# Patient Record
Sex: MALE | Race: WHITE | NOT HISPANIC OR LATINO | URBAN - METROPOLITAN AREA
[De-identification: names, ages, dates, MRNs, and addresses within clinical notes are randomized per-mention and may not be internally consistent; named-entity substitution may affect disease eponyms.]

---

## 2017-09-08 ENCOUNTER — ALLSCRIPTS OFFICE VISIT (OUTPATIENT)
Dept: OTHER | Facility: OTHER | Age: 47
End: 2017-09-08

## 2017-09-08 DIAGNOSIS — Z00.00 ENCOUNTER FOR GENERAL ADULT MEDICAL EXAMINATION WITHOUT ABNORMAL FINDINGS: ICD-10-CM

## 2017-09-08 DIAGNOSIS — R73.09 OTHER ABNORMAL GLUCOSE: ICD-10-CM

## 2017-09-08 DIAGNOSIS — E78.5 HYPERLIPIDEMIA: ICD-10-CM

## 2017-09-08 LAB
BILIRUB UR QL STRIP: NORMAL
CLARITY UR: NORMAL
COLOR UR: YELLOW
GLUCOSE (HISTORICAL): NORMAL
HGB UR QL STRIP.AUTO: NORMAL
KETONES UR STRIP-MCNC: NORMAL MG/DL
LEUKOCYTE ESTERASE UR QL STRIP: NORMAL
NITRITE UR QL STRIP: NORMAL
PH UR STRIP.AUTO: 5 [PH]
PROT UR STRIP-MCNC: NORMAL MG/DL
SP GR UR STRIP.AUTO: 1.01
UROBILINOGEN UR QL STRIP.AUTO: NORMAL

## 2017-09-15 ENCOUNTER — GENERIC CONVERSION - ENCOUNTER (OUTPATIENT)
Dept: OTHER | Facility: OTHER | Age: 47
End: 2017-09-15

## 2017-09-15 LAB
A/G RATIO (HISTORICAL): 1.5 (ref 1.2–2.2)
ALBUMIN SERPL BCP-MCNC: 4.4 G/DL (ref 3.5–5.5)
ALP SERPL-CCNC: 64 IU/L (ref 39–117)
ALT SERPL W P-5'-P-CCNC: 30 IU/L (ref 0–44)
AMBIG ABBREV CMP14 DEFAULT (HISTORICAL): NORMAL
AMBIG ABBREV LP DEFAULT (HISTORICAL): NORMAL
AMYLASE (HISTORICAL): 51 U/L (ref 31–124)
AST SERPL W P-5'-P-CCNC: 30 IU/L (ref 0–40)
BASOPHILS # BLD AUTO: 0 %
BASOPHILS # BLD AUTO: 0 X10E3/UL (ref 0–0.2)
BILIRUB SERPL-MCNC: 0.5 MG/DL (ref 0–1.2)
BUN SERPL-MCNC: 13 MG/DL (ref 6–24)
BUN/CREA RATIO (HISTORICAL): 14 (ref 9–20)
CALCIUM SERPL-MCNC: 9.7 MG/DL (ref 8.7–10.2)
CHLORIDE SERPL-SCNC: 103 MMOL/L (ref 96–106)
CHOLEST SERPL-MCNC: 168 MG/DL (ref 100–199)
CO2 SERPL-SCNC: 26 MMOL/L (ref 18–29)
CREAT SERPL-MCNC: 0.96 MG/DL (ref 0.76–1.27)
DEPRECATED RDW RBC AUTO: 13.8 % (ref 12.3–15.4)
EGFR AFRICAN AMERICAN (HISTORICAL): 110 ML/MIN/1.73
EGFR-AMERICAN CALC (HISTORICAL): 95 ML/MIN/1.73
EOSINOPHIL # BLD AUTO: 0.1 X10E3/UL (ref 0–0.4)
EOSINOPHIL # BLD AUTO: 2 %
GLUCOSE SERPL-MCNC: 100 MG/DL (ref 65–99)
HBA1C MFR BLD HPLC: 5.3 % (ref 4.8–5.6)
HCT VFR BLD AUTO: 43.9 % (ref 37.5–51)
HDLC SERPL-MCNC: 35 MG/DL
HGB BLD-MCNC: 15.4 G/DL (ref 12.6–17.7)
IMM.GRANULOCYTES (CD4/8) (HISTORICAL): 0 %
IMM.GRANULOCYTES (CD4/8) (HISTORICAL): 0 X10E3/UL (ref 0–0.1)
LDLC SERPL CALC-MCNC: 101 MG/DL (ref 0–99)
LIPASE SERPL-CCNC: 30 U/L (ref 0–59)
LYMPHOCYTES # BLD AUTO: 1.9 X10E3/UL (ref 0.7–3.1)
LYMPHOCYTES # BLD AUTO: 39 %
MAGNESIUM SERPL-MCNC: 2 MG/DL (ref 1.6–2.3)
MCH RBC QN AUTO: 30.6 PG (ref 26.6–33)
MCHC RBC AUTO-ENTMCNC: 35.1 G/DL (ref 31.5–35.7)
MCV RBC AUTO: 87 FL (ref 79–97)
MONOCYTES # BLD AUTO: 0.3 X10E3/UL (ref 0.1–0.9)
MONOCYTES (HISTORICAL): 7 %
NEUTROPHILS # BLD AUTO: 2.5 X10E3/UL (ref 1.4–7)
NEUTROPHILS # BLD AUTO: 52 %
PLATELET # BLD AUTO: 205 X10E3/UL (ref 150–379)
POTASSIUM SERPL-SCNC: 5.1 MMOL/L (ref 3.5–5.2)
RBC (HISTORICAL): 5.03 X10E6/UL (ref 4.14–5.8)
SODIUM SERPL-SCNC: 142 MMOL/L (ref 134–144)
TOT. GLOBULIN, SERUM (HISTORICAL): 3 G/DL (ref 1.5–4.5)
TOTAL PROTEIN (HISTORICAL): 7.4 G/DL (ref 6–8.5)
TRIGL SERPL-MCNC: 159 MG/DL (ref 0–149)
VLDLC SERPL CALC-MCNC: 32 MG/DL (ref 5–40)
WBC # BLD AUTO: 4.8 X10E3/UL (ref 3.4–10.8)

## 2017-09-16 LAB
INTERPRETATION (HISTORICAL): NORMAL
TSH SERPL DL<=0.05 MIU/L-ACNC: 0.52 UIU/ML (ref 0.45–4.5)

## 2017-09-17 ENCOUNTER — GENERIC CONVERSION - ENCOUNTER (OUTPATIENT)
Dept: OTHER | Facility: OTHER | Age: 47
End: 2017-09-17

## 2018-01-15 NOTE — PROGRESS NOTES
Assessment    1  Encounter for preventive health examination (V70 0) (Z00 00)   2  BMI 34 0-34 9,adult (V85 34) (Z68 34)    Plan  Abnormal blood sugar, BMI 34 0-34 9,adult, Borderline hyperlipidemia    · (1) HEMOGLOBIN A1C; Status:Active; Requested [id number]:[id number]; BMI 34 0-34 9,adult, Borderline hyperlipidemia, Health Maintenance    · (1) AMYLASE; Status:Active; Requested XSI:[id number];    · (1) CBC/PLT/DIFF; Status:Active; Requested QZF:[id number];    · (1) COMPREHENSIVE METABOLIC PANEL; Status:Active; Requested IKP:[id number];    · (1) LIPASE; Status:Active; Requested AKA:[id number];    · (1) LIPID PANEL, FASTING; Status:Active; Requested FZI:[id number];    · (1) MAGNESIUM; Status:Active; Requested for:55Xrg1164;    · (1) TSH WITH FT4 REFLEX; Status:Active; Requested FUY:[id number]; Health Maintenance    · Urine Dip Automated- POC; Status:Complete;   Done: [id number] 03:36PM    Discussion/Summary  Currently, he eats an adequate diet and has an adequate exercise regimen  Prostate cancer screening: the risks and benefits of prostate cancer screening were discussed  Colorectal cancer screening: the risks and benefits of colorectal cancer screening were discussed  Screening lab work includes glucose, lipid profile, 25-hydroxyvitamin D and urinalysis  The risks and benefits of immunizations were discussed  Advice and education were given regarding nutrition, weight loss, cardiovascular risk reduction, helmet use, seat belt use and advanced directive planning  Patient discussion: discussed with the patient  Possible side effects of new medications were reviewed with the patient/guardian today  The treatment plan was reviewed with the patient/guardian  The patient/guardian understands and agrees with the treatment plan      Chief Complaint  pt here for PE would like to discuss cardiac , patients dad has heart issues  tc/cma      Active Problems    1  Skin tag, acquired (701 9) (L91 8)   2   Tick bite (919 4,E906 4) [facility])    Past Medical History    · History of No significant past medical history   · History of No significant past surgical history    Family History  Mother    · No pertinent family history  Father    · Family history of heart attack (V17 3) (Z82 49)  Paternal Grandfather    · Family history of malignant neoplasm (V16 9) (Z80 9)    Social History    ·    · Never a smoker   · No alcohol use   · No drug use    Current Meds   1  No Reported Medications Recorded    Allergies    1  No Known Drug Allergies    Vitals   Recorded: 08Sep2017 03:15PM   Temperature 98 4 F, Temporal   Heart Rate 92, R Radial   Pulse Quality Normal, R Radial   Respiration Quality Normal   Respiration 16   Systolic 853, RUE, Sitting   Diastolic 80, RUE, Sitting   Height 6 ft    Weight 254 lb    BMI Calculated 34 45   BSA Calculated 2 36     Results/Data  Urine Dip Automated- POC [id number] 03:36PM Fletcher García     Test Name Result Flag Reference   Color Yellow     Clarity Transparent     Leukocytes neg     Nitrite neg     Blood neg     Bilirubin neg     Urobilinogen norm     Protein neg     Ph 5     Specific Gravity 1 010     Ketone neg     Glucose norm       PHQ-2 Adult Depression Screening 08Sep2017 03:21PM User, Joshua     Test Name Result Flag Reference   PHQ-2 Adult Depression Score 0     Over the last two weeks, how often have you been bothered by any of the following problems? Little interest or pleasure in doing things: Not at all - 0  Feeling down, depressed, or hopeless: Not at all - 0   PHQ-2 Adult Depression Screening Negative         Procedure    Procedure: Visual Acuity Test    Indication: routine screening     Results: 20/15 in both eyes without corrective device, 20/15 in the right eye without corrective device, 20/15 in the left eye without corrective device      Signatures   Electronically signed by : YADIRA Vera ; Sep 11 2017  7:46AM EST                       (Author)

## 2018-01-16 NOTE — RESULT NOTES
Discussion/Summary   please call or sched appt to further discuss results--best regards-Dr Latonya Buckley     Verified Results  [facility]) Cardiovascular Risk Assessment [id number] 10:08AM Beatriz Sandoval     Test Name Result Flag Reference   Interpretation Note     -------------------------------  CARDIOVASCULAR REPORT:  -------------------------------  Current available clinical information suggests the  patient's risk is at least LOW  One major CHD risk factor is  present (HDL-C less than 40)  If the patient has CHD or a  CHD risk equivalent, the risk category is high  If patient  does not have CHD or a CHD risk equivalent, consider use of  the Pooled Cohort Equations to estimate 10-year CVD risk, as  individuals with greater than 7 5% risk may warrant more  intensive therapy  The calculator can be found at:  http://tools  cardiosource org/RGROV-Syor-Grrkthybz/  -  Insulin resistance, obesity, excessive alcohol use, smoking,  nephrotic syndrome, liver disease, and certain medications  can cause secondary dyslipidemia  Consider evaluation if  clinically indicated  -  Therapeutic lifestyle changes are always valuable to achieve  optimal blood lipid status (diet, exercise, weight  management)  -------------------------------  LIPID MANAGEMENT  Select one patient risk category based upon medical history  and clinical judgment  Additional risk factors such as  personal or family history of premature CHD, smoking, and  hypertension modify a patient's goals of therapy  In CVD  prevention, the intensity of therapy should be adjusted to  the level of patient risk  MODERATE intensity statin therapy  generally results in an average LDL-C reduction of 30% to  less than 50% from the untreated baseline  Examples include  (daily doses): atorvastatin 10-20 mg, rosuvastatin 5-10 mg,  simvastatin 20-40 mg, pravastatin 40-80 mg, lovastatin 40  mg   HIGH intensity statin therapy generally results in an  average LDL-C reduction of 50% or more from the untreated  baseline  Examples include (daily doses): atorvastatin 40-80  mg and rosuvastatin 20 mg   -------------------------------  LOW RISK ASSESSMENT AND TREATMENT SUGGESTIONS  -------------------------------  LDL-C is acceptable, was 107 and now is 101 mg/dL  Non-HDL  Cholesterol is acceptable, was 140 and now is 133 mg/dL  -  Considerations for use of statin therapy include family  history of premature atherosclerotic disease, elevated  coronary artery calcium score, ankle-brachial index < 0 9,  elevated CRP, or elevated 10-year or lifetime CVD risk  Elevated triglycerides may be associated with increased  cardiovascular risk due to increased numbers of atherogenic  lipoprotein particles  Co-morbid conditions should be  evaluated and treated  -------------------------------  INTERMEDIATE RISK ASSESSMENT AND TREATMENT SUGGESTIONS  -------------------------------  LDL-C is acceptable, was 107 and now is 101 mg/dL  Non-HDL  Cholesterol is acceptable, was 140 and now is 133 mg/dL  -  Consider measurement of LDL particle number or Apo B to  adjudicate need for further LDL lowering therapy  Consider  beginning or increasing statin  Factors that may influence  statin use include family history of premature  atherosclerotic disease, elevated coronary artery calcium  score, ankle-brachial index < 0 9, elevated CRP, or elevated  10-year or lifetime CVD risk  If statin cannot be tolerated  or increased, alternatives include use of an intestinal  agent (ezetimibe or bile acid sequestrant), niacin, and/or  fish oil   -------------------------------  HIGH RISK ASSESSMENT AND TREATMENT SUGGESTIONS  -------------------------------  LDL-C is borderline high, was 107 and now is 101 mg/dL  Non-HDL Cholesterol is borderline high, was 140 and now is  133 mg/dL  -  Begin statin  If statin already in use, consider increasing  dose to achieve at least a 50% LDL reduction from baseline    Moderate or high intensity statin is preferred  If statin  cannot be tolerated or increased, alternatives include use  of an intestinal agent (ezetimibe or bile acid sequestrant),  niacin, and/or fish oil   -------------------------------  DISCLAIMER  These assessments and treatment suggestions are provided as  a convenience in support of the physician-patient  relationship and are not intended to replace the physician's  clinical judgment  They are derived from national guidelines  in addition to other evidence and expert opinion  The  clinician should consider this information within the  context of clinical opinion and the individual patient  SEE GUIDANCE FOR CARDIOVASCULAR REPORT: Xochitl Mistry et al  2013  ACC/AHA guideline on the treatment of blood cholesterol to  reduce atherosclerotic cardiovascular risk in adults: a  report of the [employer] of Cardiology/American Heart  Association Task Force on Practice Guidelines  Circulation  2014; 129 (suppl 2): S1?S45; Dana et al  Clin Chem 2009;  55(3):407-419; Jennyl et al  Diabetes Care 2008;  31(4):811-82  PDF Image          (LC) TSH Rfx on Abnormal to Free T4 [id number] 10:08AM Jobaline     Test Name Result Flag Reference   TSH 0 519 uIU/mL  0 450-4 500     (1) COMPREHENSIVE METABOLIC PANEL [id number] 94:76UF Jobaline     Test Name Result Flag Reference   Glucose, Serum 100 mg/dL H 65-99   BUN 13 mg/dL  6-24   Creatinine, Serum 0 96 mg/dL  0 76-1 27   BUN/Creatinine Ratio 14  9-20   Sodium, Serum 142 mmol/L  134-144   Potassium, Serum 5 1 mmol/L  3 5-5 2   Chloride, Serum 103 mmol/L     Carbon Dioxide, Total 26 mmol/L  18-29   Calcium, Serum 9 7 mg/dL  8 7-10 2   Protein, Total, Serum 7 4 g/dL  6 0-8 5   Albumin, Serum 4 4 g/dL  3 5-5 5   Globulin, Total 3 0 g/dL  1 5-4 5   A/G Ratio 1 5  1 2-2 2   Bilirubin, Total 0 5 mg/dL  0 0-1 2   Alkaline Phosphatase, S 64 IU/L     AST (SGOT) 30 IU/L  0-40   ALT (SGPT) 30 IU/L  0-44   eGFR If NonAfricn Am 95 mL/min/1 73  >59 eGFR If Africn Am 110 mL/min/1 73  >59     (LC) Lipid Panel [id number] 10:08AM Restrepo Kale     Test Name Result Flag Reference   Cholesterol, Total 168 mg/dL  100-199   Triglycerides 159 mg/dL H 0-149   HDL Cholesterol 35 mg/dL L >39   VLDL Cholesterol Fabricio 32 mg/dL  5-40   LDL Cholesterol Calc 101 mg/dL H 0-99     (1) AMYLASE [id number] 10:08AM Restrepo Kale     Test Name Result Flag Reference   Amylase, Serum 51 U/L       (1) LIPASE [id number] 10:08AM Restrepo Kale     Test Name Result Flag Reference   Lipase, Serum 30 U/L  0-59     (1) MAGNESIUM [id number] 10:08AM Restrepo Kale     Test Name Result Flag Reference   Magnesium, Serum 2 0 mg/dL  1 6-2 3     (1) HEMOGLOBIN A1C [id number] 10:08AM Paterno, Levie Bread     Test Name Result Flag Reference   Hemoglobin A1c 5 3 %  4 8-5 6   Pre-diabetes: 5 7 - 6 4           Diabetes: >6 4           Glycemic control for adults with diabetes: <7 0     [facility] Default [id number] 10:08AM Restrepo Kale     Test Name Result Flag Reference   [place] [facility] CMP14 Default Comment     A hand-written panel/profile was received from your office  In  accordance with the [facility] Ambiguous Test Code Policy dated July 4063, we have completed your order by using the closest currently  or formerly recognized AMA panel  We have assigned Comprehensive  Metabolic Panel (14), Test Code #039818 to this request   If this  is not the testing you wished to receive on this specimen, please  contact the [address] [place] [address] Client Inquiry/Technical Services Department  to clarify the test order  We appreciate your business  [facility]) [facility] Default [id number] 10:08AM Restrepo Kale     Test Name Result Flag Reference   Ambig Abbrev LP Default Comment     A hand-written panel/profile was received from your office   In  accordance with the [facility] Ambiguous Test Code Policy dated July 2774, we have completed your order by using the closest currently  or formerly recognized AMA panel  We have assigned Lipid Panel,  Test Code #194376 to this request  If this is not the testing you  wished to receive on this specimen, please contact the [address] [place] [address]  Client Inquiry/Technical Services Department to clarify the test  order  We appreciate your business       (1) CBC/PLT/DIFF [id number] 10:08AM Paige Gregory     Test Name Result Flag Reference   WBC 4 8 x10E3/uL  3 4-10 8   RBC 5 03 x10E6/uL  4 14-5 80   Hemoglobin 15 4 g/dL  12 6-17 7   Hematocrit 43 9 %  37 5-51 0   MCV 87 fL  79-97   MCH 30 6 pg  26 6-33 0   MCHC 35 1 g/dL  31 5-35 7   RDW 13 8 %  12 3-15 4   Platelets 986 Q70Z3/SW  150-379   Neutrophils 52 %     Lymphs 39 %     Monocytes 7 %     Eos 2 %     Basos 0 %     Neutrophils (Absolute) 2 5 x10E3/uL  1 4-7 0   Lymphs (Absolute) 1 9 x10E3/uL  0 7-3 1   Monocytes(Absolute) 0 3 x10E3/uL  0 1-0 9   Eos (Absolute) 0 1 x10E3/uL  0 0-0 4   Baso (Absolute) 0 0 x10E3/uL  0 0-0 2   Immature Granulocytes 0 %     Immature Grans (Abs) 0 0 x10E3/uL  0 0-0 1

## 2018-01-18 NOTE — RESULT NOTES
Verified Results  Urine Dip Automated- POC [id number] 03:36PM Lavon Cornejo     Test Name Result Flag Reference   Color Yellow     Clarity Transparent     Leukocytes neg     Nitrite neg     Blood neg     Bilirubin neg     Urobilinogen norm     Protein neg     Ph 5     Specific Gravity 1 010     Ketone neg     Glucose norm         Plan  Abnormal blood sugar, BMI 34 0-34 9,adult, Borderline hyperlipidemia    · (1) HEMOGLOBIN A1C; Status:Active; Requested [id number]:[id number]; BMI 34 0-34 9,adult, Borderline hyperlipidemia, Health Maintenance    · (1) AMYLASE; Status:Active; Requested OUB:[id number];    · (1) CBC/PLT/DIFF; Status:Active; Requested AKQ:[id number];    · (1) COMPREHENSIVE METABOLIC PANEL; Status:Active; Requested RPR:[id number];    · (1) LIPASE; Status:Active; Requested UMB:[id number];    · (1) LIPID PANEL, FASTING; Status:Active; Requested KMB:[id number];    · (1) MAGNESIUM; Status:Active; Requested for:[id number];    · (1) TSH WITH FT4 REFLEX; Status:Active; Requested MSX:[id number];    Health Maintenance    · Urine Dip Automated- POC; Status:Complete;   Done: [id number] 03:36PM

## 2018-01-22 VITALS
RESPIRATION RATE: 16 BRPM | HEIGHT: 72 IN | WEIGHT: 254 LBS | SYSTOLIC BLOOD PRESSURE: 118 MMHG | HEART RATE: 92 BPM | TEMPERATURE: 98.4 F | BODY MASS INDEX: 34.4 KG/M2 | DIASTOLIC BLOOD PRESSURE: 80 MMHG

## 2022-02-11 ENCOUNTER — TELEPHONE (OUTPATIENT)
Dept: GASTROENTEROLOGY | Facility: CLINIC | Age: 52
End: 2022-02-11

## 2022-02-11 NOTE — TELEPHONE ENCOUNTER
I am working on the work que for colon screenings  Pt had referral in chart for a colon screening  I called him to see if he wanted to get scheduled  He stated that he had it done in [facility] @ a place called [facility]  Maybe someone from you office can get you those results, if you don't have them already  Thank you